# Patient Record
Sex: FEMALE | Race: WHITE
[De-identification: names, ages, dates, MRNs, and addresses within clinical notes are randomized per-mention and may not be internally consistent; named-entity substitution may affect disease eponyms.]

---

## 2017-01-16 ENCOUNTER — HOSPITAL ENCOUNTER (EMERGENCY)
Dept: HOSPITAL 25 - UCCORT | Age: 27
Discharge: HOME | End: 2017-01-16
Payer: COMMERCIAL

## 2017-01-16 VITALS — SYSTOLIC BLOOD PRESSURE: 107 MMHG | DIASTOLIC BLOOD PRESSURE: 61 MMHG

## 2017-01-16 DIAGNOSIS — F17.210: ICD-10-CM

## 2017-01-16 DIAGNOSIS — O26.899: Primary | ICD-10-CM

## 2017-01-16 DIAGNOSIS — Z88.0: ICD-10-CM

## 2017-01-16 DIAGNOSIS — J32.9: ICD-10-CM

## 2017-01-16 PROCEDURE — G0463 HOSPITAL OUTPT CLINIC VISIT: HCPCS

## 2017-01-16 PROCEDURE — 99202 OFFICE O/P NEW SF 15 MIN: CPT

## 2017-01-16 NOTE — UC
Throat Pain/Nasal [place] HPI





- HPI Summary


HPI Summary: 


complaint of nasal congestion cough and sore throat that started 2 weeks ago


both ears are constantly poopping


thick green nasal discharge


 frequent headaches


denies fever and chills


taking acetaminophen and nasocort without relief





LMP 12/17/16


 2 home [pregnancy tests 


appt with ob/gyn ION 1/30/17


mild cramping feeling yesterday and today


denies abdominal pain vaginal discharge or bleeding











- History of Current Complaint


Chief Complaint: UCRespiratory


Stated Complaint: RESPIRATORY


Time Seen by Provider: 01/16/17 16:43


Hx Obtained From: Patient


Hx Last Menstrual Period: 12/17/16





- Allergies/Home Medications


Allergies/Adverse Reactions: 


 Allergies











Allergy/AdvReac Type Severity Reaction Status Date / Time


 


Amoxicillin Allergy Intermediate Hives Verified 01/16/17 16:05











Home Medications: 


 Home Medications





Cyclobenzaprine TAB* [Flexeril TAB*] 10 mg PO TID 01/16/17 [History Confirmed 01 /16/17]


Loratadine [Claritin] 10 mg PO DAILY 01/16/17 [History Confirmed 01/16/17]


Mometasone NASAL (NF) [Nasonex (NF)] 50 mcg NA DAILY 01/16/17 [History 

Confirmed 01/16/17]


busPIRone TAB* [Buspar TAB*] 10 mg PO BID 01/16/17 [History Confirmed 01/16/17]











PMH/Surg Hx/FS Hx/Imm Hx


Previously Healthy: Yes - pregnancy 


Respiratory History Of: Reports: Asthma





- Surgical History


Surgical History: Yes


Surgery Procedure, Year, and Place: gallbladder





- Family History


Known Family History: 


   Negative: Cardiac Disease, Hypertension, Diabetes





- Social History


Occupation: Unemployed


Lives: With Family


Alcohol Use: None


Substance Use Type: None


Smoking Status (MU): Heavy Every Day Tobacco Smoker


Type: Cigarettes


Amount Used/How Often: 1 ppd


Length of Time of Smoking/Using Tobacco: since 17 age


Have You Smoked in the Last Year: Yes


Cessation Counseling: Patient Advised to Stop





Review of Systems


Constitutional: Negative


Skin: Negative


Eyes: Negative


ENT: Sore Throat, Ear Ache, Nasal Discharge


Respiratory: Cough


Cardiovascular: Negative


Gastrointestinal: Negative


Genitourinary: Negative


Motor: Negative


Neurovascular: Negative


Musculoskeletal: Negative


Neurological: Negative


Psychological: Negative


All Other Systems Reviewed And Are Negative: Yes





Physical Exam


Triage Information Reviewed: Yes


Appearance: No Pain Distress, Well-Nourished, Ill-Appearing


Vital Signs: 


 Initial Vital Signs











Temp  98.5 F   01/16/17 15:57


 


Pulse  64   01/16/17 15:57


 


Resp  16   01/16/17 15:57


 


BP  107/61   01/16/17 15:57


 


Pulse Ox  100   01/16/17 15:57











Vital Signs Reviewed: Yes


Eyes: Positive: Conjunctiva Clear


ENT: Positive: Pharyngeal erythema, Nasal congestion, Nasal drainage, TMs normal

, Other: - maxillary sinus tenderness


Neck: Positive: No Lymphadenopathy


Respiratory: Positive: Lungs clear, Normal breath sounds, No respiratory 

distress


Cardiovascular: Positive: RRR, No Murmur


Abdomen Description: Positive: Nontender, Soft.  Negative: CVA Tenderness (R), 

CVA Tenderness (L), Distended, Guarding


Bowel Sounds: Positive: Present


Musculoskeletal: Positive: No Edema


Neurological: Positive: Alert


Psychological Exam: Normal


Skin Exam: Normal





Throat Pain/Nasal Course/Dx





- Course


Course Of Treatment: exam completed.  pt illness over 2 weeks.  anson treat with 

zithromax as recommended by up to date for pregnant women with sinusitis.  -

negative for abdominal pain on exam, afebrile, no vaginal bleeding.  -pt 

refuses GYN exam.  - discussed if she begins to have cramping and pain that is 

increasing to seek care in ED and pt states understanding





- Differential Dx/Diagnosis


Provider Diagnoses: sinusitis





- Physician Notification/Consults


Discussed Patient Care With: Dr Islas


Time Discussed With Above Provider: 17:00





Discharge





- Discharge Plan


Condition: Stable


Disposition: HOME


Prescriptions: 


Azithromycin TAB* [Zithromax TAB (Z-GIOVANNA) 250 mg #6 tabs] 2 tab PO .TODAY, THEN 

1 DAILY #1 giovanna


Patient Education Materials:  Sinusitis (ED), Pregnancy (ED)


Referrals: 


Non Staff,Doctor [Primary Care Provider] - 


[facility] [place] [facility] [place]]


Additional Instructions: 


Please take antibiotic as directed.


 Increase fluids and rest


Take acetaminophen for fever or pain


 I f you develop abdominal pain, vaginal bleeding heavy cramping or fever 

please seek care in the Emergency Department.


Please review your discharge instructions.


 If your symptoms do not improve please call your primary care provider or 

return to urgent care.

## 2018-10-02 ENCOUNTER — HOSPITAL ENCOUNTER (EMERGENCY)
Dept: HOSPITAL 25 - UCCORT | Age: 28
Discharge: HOME | End: 2018-10-02
Payer: COMMERCIAL

## 2018-10-02 VITALS — DIASTOLIC BLOOD PRESSURE: 68 MMHG | SYSTOLIC BLOOD PRESSURE: 101 MMHG

## 2018-10-02 DIAGNOSIS — Z88.0: ICD-10-CM
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
## 2018-10-02 DIAGNOSIS — F17.210: ICD-10-CM

## 2018-10-02 DIAGNOSIS — S46.911A: Primary | ICD-10-CM

## 2018-10-02 PROCEDURE — 72040 X-RAY EXAM NECK SPINE 2-3 VW: CPT

## 2018-10-02 PROCEDURE — 96372 THER/PROPH/DIAG INJ SC/IM: CPT

## 2018-10-02 PROCEDURE — G0463 HOSPITAL OUTPT CLINIC VISIT: HCPCS

## 2018-10-02 PROCEDURE — 71046 X-RAY EXAM CHEST 2 VIEWS: CPT

## 2018-10-02 PROCEDURE — 99212 OFFICE O/P EST SF 10 MIN: CPT

## 2018-10-02 NOTE — UC
General HPI





- HPI Summary


HPI Summary: 





pt complains of right shoulder, neck, and upper right back pain.  she states 

that she fell a few days ago, landed on her right arm. she heard a crack in her 

neck.  she has been having these pains as stated above.  she has taken flexeril 

and it helped a little.





- History of Current Complaint


Chief Complaint: UCUpperExtremity


Stated Complaint: RIGHT SHOULDER PAIN


Hx Obtained From: Patient


Hx Last Menstrual Period: 9/17/18 has had tubal


Onset/Duration: Sudden Onset


Onset Severity: Moderate


Current Severity: Moderate


Pain Intensity: 6


Associated Signs & Symptoms: Positive: Back Pain, Trauma.  Negative: Abdominal 

Pain, Confusion, Cough, Chest Pain, Dizziness, Diarrhea, Dysuria, Edema, Fever, 

Headache, Hemoptysis, Melena, Nausea, Palpitations, Syncope, SOB, Vomiting, 

Wheezing, Weakness





- Allergy/Home Medications


Allergies/Adverse Reactions: 


 Allergies











Allergy/AdvReac Type Severity Reaction Status Date / Time


 


amoxicillin Allergy Intermediate Rash Verified 10/02/18 17:34


 


Penicillins Allergy Intermediate Rash Verified 10/02/18 17:34











Home Medications: 


 Home Medications





diPHENhydraMINE PO* [Benadryl PO 25 MG TAB*] 25 mg PO BEDTIME PRN 10/02/18 [

History Confirmed 10/02/18]











PMH/Surg Hx/FS Hx/Imm Hx


Previously Healthy: Yes





- Surgical History


Surgical History: Yes


Surgery Procedure, Year, and Place: gallbladder.  removal of fallopian tubes





- Family History


Known Family History: 


   Negative: Cardiac Disease, Hypertension, Diabetes





- Social History


Alcohol Use: Rare


Substance Use Type: None


Smoking Status (MU): Heavy Every Day Tobacco Smoker


Type: Cigarettes


Amount Used/How Often: 1/2  to 1 ppd


Length of Time of Smoking/Using Tobacco: since 17 age


Have You Smoked in the Last Year: Yes





Review of Systems


Constitutional: Negative


Skin: Negative


Eyes: Negative


ENT: Negative


Respiratory: Negative


Cardiovascular: Negative


Gastrointestinal: Negative


Motor: Negative


Neurovascular: Negative


Musculoskeletal: Other: - right shoulder upper chest and upper back pain


Neurological: Negative


Psychological: Negative


Is Patient Immunocompromised?: No


All Other Systems Reviewed And Are Negative: No





Physical Exam


Triage Information Reviewed: Yes


Appearance: Well-Appearing, No Pain Distress, Well-Nourished, Ill-Appearing


Vital Signs: 


 Initial Vital Signs











Temp  97.3 F   10/02/18 17:26


 


Pulse  88   10/02/18 17:26


 


Resp  17   10/02/18 17:26


 


BP  101/68   10/02/18 17:26


 


Pulse Ox  99   10/02/18 17:26











Vital Signs Reviewed: Yes


Eye Exam: Normal


Eyes: Positive: Conjunctiva Clear, Conjunctiva Inflamed


ENT Exam: Normal


ENT: Positive: Hearing grossly normal, Pharyngeal erythema, TMs normal.  

Negative: Nasal drainage, Tonsillar exudate, Trismus


Dental Exam: Normal


Neck exam: Normal


Neck: Positive: Supple, Nontender


Respiratory Exam: Normal


Respiratory: Positive: Chest non-tender, Lungs clear, Normal breath sounds, No 

respiratory distress


Cardiovascular Exam: Normal


Cardiovascular: Positive: RRR, No Murmur, Pulses Normal


Abdomen Description: Positive: Nontender, Soft


Bowel Sounds: Positive: Present


Musculoskeletal: Positive: Other: - pt is tender to palpation to her right 

trapezius muscle and her right upper chest wall. she is able to raise her right 

arm all the way to her right ear without any difficulty.


Neurological Exam: Normal


Psychological Exam: Normal





Course/Dx





- Course


Course Of Treatment: xrays of c-spine, chest and right shoulder show no 

evidence of frx.  pt given tylenol, motrin and flexeril. she is feeling better.

  she was given a work excuse.  she states she still has flexeril at home.





- Differential Dx - Multi-Symptom


Provider Diagnoses: muscle strain





Discharge





- Sign-Out/Discharge


Documenting (check all that apply): Patient Departure


All imaging exams completed and their final reports reviewed: Yes





- Discharge Plan


Condition: Stable


Disposition: HOME


Patient Education Materials:  Muscle Strain (ED)


Forms:  *Work Release


Referrals: 


Jason Leung MD [Primary Care Provider] - 


Additional Instructions: 


Take tylenol, motrin, and the flexeril as previously prescribed.  return if 

worse or any new symptoms.  Please follow up with your doctor in 2 days.   

Avoid any heavy lifting or straining. 





- Billing Disposition and Condition


Condition: STABLE


Disposition: Home

## 2018-10-03 NOTE — RAD
INDICATION: Anterior chest pain post fall one week ago.



COMPARISON: No relevant prior exams available on the [facility] PACS for comparison.



TECHNIQUE:  Dual energy PA and routine lateral views of the chest were obtained.



REPORT: Clear lungs and pleural spaces. Negative for pneumothorax. The heart, pulmonary

vasculature, and mediastinal contours are unremarkable. Unremarkable osseous structures

and soft tissue contours. Gallbladder fossa level surgical clips. 



IMPRESSION: 

#. No radiographic evidence for traumatic thoracic injury or acute intrathoracic process.



R1

## 2018-10-03 NOTE — RAD
HISTORY: pain, s/p fall, trauma 1 week ago



COMPARISONS: None



VIEWS: 3 , Frontal, lateral, and open-mouth odontoid views of the cervical spine.



FINDINGS: 

The cervical spine is visualized from the skull base through C7 .



ALIGNMENT: There is suboptimal visualization of the C7-T1 alignment. There is

straightening of the normal cervical lordosis.

VERTEBRAL BODIES:  The odontoid process is intact. The atlantoaxial intervals are

symmetric.

JOINTS:  There is no subluxation or dislocation. The facet joints are unremarkable.

INTERVERTEBRAL DISCS:  There is diffuse loss of intervertebral disc height.

SOFT TISSUE: The prevertebral soft tissues are normal.



OTHER:  The skull base is normal. The lung apices are clear.



IMPRESSION: 

1.  THERE IS SUBOPTIMAL VISUALIZATION OF THE C7-T1  ALIGNMENT ON THE LATERAL VIEW.

2.  STRAIGHTENING OF THE CERVICAL LORDOSIS.

3.  NO ACUTE OSSEOUS INJURY WITHIN THE VISUALIZED PORTION OF THE CERVICAL SPINE. 



R2

## 2018-10-03 NOTE — RAD
HISTORY: pain



COMPARISONS: None



VIEWS: 4 , Frontal internal rotation, external rotation, outlet, and axillary views of the

right shoulder 



FINDINGS:



BONE DENSITY: Normal.

BONES: There is no displaced fracture.

JOINTS: There is no arthropathy.

ALIGNMENT: There is no dislocation. 

SOFT TISSUES: Unremarkable.



OTHER FINDINGS: None.



IMPRESSION: 

NO ACUTE OSSEOUS INJURY. IF SYMPTOMS PERSIST, RECOMMEND REPEAT IMAGING.



R0

## 2020-01-21 ENCOUNTER — HOSPITAL ENCOUNTER (EMERGENCY)
Dept: HOSPITAL 25 - UCCORT | Age: 30
Discharge: HOME | End: 2020-01-21
Payer: COMMERCIAL

## 2020-01-21 VITALS — SYSTOLIC BLOOD PRESSURE: 106 MMHG | DIASTOLIC BLOOD PRESSURE: 63 MMHG

## 2020-01-21 DIAGNOSIS — F17.210: ICD-10-CM

## 2020-01-21 DIAGNOSIS — Z88.0: ICD-10-CM

## 2020-01-21 DIAGNOSIS — M43.6: Primary | ICD-10-CM

## 2020-01-21 PROCEDURE — 99212 OFFICE O/P EST SF 10 MIN: CPT

## 2020-01-21 PROCEDURE — 96372 THER/PROPH/DIAG INJ SC/IM: CPT

## 2020-01-21 PROCEDURE — G0463 HOSPITAL OUTPT CLINIC VISIT: HCPCS

## 2020-01-21 PROCEDURE — 72050 X-RAY EXAM NECK SPINE 4/5VWS: CPT

## 2020-01-21 NOTE — UC
Neck Pain HPI





- HPI Summary


HPI Summary: 





30 yo with onset of pain in the back of the neck this morning, without recall 

of injury, although she and her brother were playing around with her children a 

few days ago and she thinks she could have strained that way. She has used one 

dose of acetaminophen today without relief. Pain relieved by supporting the 

back of her neck. 





- History of Current Complaint


Chief Complaint: UCGeneralIllness


Stated Complaint: NECK PAIN/HEAD SWELLING


Time Seen by Provider: 01/21/20 17:31


Hx Obtained From: Patient


Hx Last Menstrual Period: 1/16/20


Mechanism Of Injury: No Known Trauma


Timing: Constant


Onset/Duration: Sudden Onset


Severity: Moderate


Pain Intensity: 8


Location: Radiates To: - right arm


Character: Aching, Stiff


Aggravating Factors: Position, Movement


Alleviating Factors: Nothing


Associated Signs & Symptoms: Positive: Headache, Paresthesia - right hand 

distal third digit





- Risk Factors


Meningitis Risk Factors: Negative





- Allergies/Home Medications


Allergies/Adverse Reactions: 


 Allergies











Allergy/AdvReac Type Severity Reaction Status Date / Time


 


amoxicillin Allergy Intermediate Rash Verified 01/21/20 17:37


 


Penicillins Allergy Intermediate Rash Verified 01/21/20 17:37











Home Medications: 


 Home Medications





Acid Pill  01/21/20 [History]











PMH/Surg Hx/FS Hx/Imm Hx





- Additional Past Medical History


Additional PMH: 





recent biopsy of her throat was "negative" per patient (Dr. River-11/19)


Previously Healthy: No - Smoker


Psychological History: Anxiety





- Surgical History


Surgical History: Yes


Surgery Procedure, Year, and Place: gallbladder.  removal of fallopian tubes.  

throat bx-11/2019





- Family History


Known Family History: Positive: Cardiac Disease - mother had MI and cardiac 

arrest 2020


   Negative: Hypertension, Diabetes





- Social History


Occupation: Employed Part-time


Lives: With Family


Alcohol Use: Rare


Substance Use Type: None


Smoking Status (MU): Heavy Every Day Tobacco Smoker


Type: Cigarettes


Amount Used/How Often: 1/2  to 1 ppd


Length of Time of Smoking/Using Tobacco: since 17 age


Have You Smoked in the Last Year: Yes





Review of Systems


All Other Systems Reviewed And Are Negative: Yes


Constitutional: Positive: Negative


Skin: Positive: Negative


Eyes: Positive: Negative


ENT: Positive: Negative


Respiratory: Positive: Negative


Cardiovascular: Positive: Negative


Gastrointestinal: Positive: Negative


Genitourinary: Positive: Negative


Motor: Positive: Negative


Neurovascular: Positive: Negative


Musculoskeletal: Positive: Arthralgia


Neurological: Positive: Headache.  Negative: Weakness


Psychological: Positive: Anxious


Is Patient Immunocompromised?: No





Physical Exam


Triage Information Reviewed: Yes


Appearance: Ill-Appearing - looks chronically unwell, Pain Distress - mild to 

moderate


Vital Signs: 


 Initial Vital Signs











Temp  98.4 F   01/21/20 17:31


 


Pulse  75   01/21/20 17:31


 


Resp  16   01/21/20 17:31


 


BP  106/63   01/21/20 17:31


 


Pulse Ox  100   01/21/20 17:31











ENT: Positive: Pharynx normal, TMs normal


Neck exam: Other - no swelling, warmth or erythema noted in skin of head and 

neck.


Neck: Positive: Tenderness @ - cervical spine top to bottom


Respiratory: Positive: Lungs clear, Normal breath sounds


Cardiovascular: Positive: RRR, No Murmur


Musculoskeletal Exam: Normal


Musculoskeletal: Positive: ROM Limited @ - cervical


Neurological Exam: Other - No pronator drift, normal hand  without muscle 

wasting, normal DTR's.


Neurological: Positive: Alert, Muscle Tone Normal


Psychological Exam: Other - anxious


Skin Exam: Normal





Diagnostics





- Radiology


  ** No standard instances


Radiology Interpretation Completed By: ED Physician


Summary of Radiographic Findings: Flat lordotic curve without evidence of bony 

injury.





Neck Pain Course/Dx





- Course


Course Of Treatment: 





Toradol with some relief. Warm moist heat, off work, increase flexeril to bid 

ot tid temporarily. 





- Differential Dx/Diagnosis


Differential Dx/HQI/PQRI: Sprain, Torticollis


Provider Diagnosis: 


 Acute torticollis








Discharge ED





- Sign-Out/Discharge


Documenting (check all that apply): Patient Departure


All imaging exams completed and their final reports reviewed: No





- Discharge Plan


Condition: Stable


Disposition: HOME


Prescriptions: 


Naproxen [Naproxen 500 mg tab] 500 mg PO BID #30 tablet


Patient Education Materials:  Spasmodic Torticollis (ED)


Forms:  *Work Release


Referrals: 


Jason Leung MD [Primary Care Provider] - 


Additional Instructions: 


Use naproxen twice daily for relief of pain, taking it with food. Stop if it 

causes upset stomach or nausea. 


Increase flexeril to 2 or 3 times per day, and apply warm moist heat to your 

neck. 


The radiologist will review my reading of the xrays tomorrow, and you will be 

called if there is a discrepancy. 


You have a referral for physical therapy. 


Follow up with your [facility] doctor in a week to review progress. 





- Billing Disposition and Condition


Condition: STABLE


Disposition: Home

## 2020-01-22 NOTE — UC
- Progress Note


Progress Note: 





xray report cervical spine: IMPRESSION: STRAIGHTENING OF THE CERVICAL SPINE, 

OTHERWISE UNREMARKABLE STUDY.





Course/Dx





- Diagnoses


Provider Diagnoses: 


 Acute torticollis








Discharge ED





- Sign-Out/Discharge


Documenting (check all that apply): Patient Departure


All imaging exams completed and their final reports reviewed: Yes





- Discharge Plan


Condition: Stable


Disposition: HOME


Prescriptions: 


Naproxen [Naproxen 500 mg tab] 500 mg PO BID #30 tablet


Patient Education Materials:  Spasmodic Torticollis (ED)


Forms:  *Work Release


Referrals: 


Jason Leung MD [Primary Care Provider] - 


Additional Instructions: 


Use naproxen twice daily for relief of pain, taking it with food. Stop if it 

causes upset stomach or nausea. 


Increase flexeril to 2 or 3 times per day, and apply warm moist heat to your 

neck. 


The radiologist will review my reading of the xrays tomorrow, and you will be 

called if there is a discrepancy. 


You have a referral for physical therapy. 


Follow up with your [facility] doctor in a week to review progress. 





- Billing Disposition and Condition


Condition: STABLE


Disposition: Home